# Patient Record
Sex: MALE | Race: OTHER
[De-identification: names, ages, dates, MRNs, and addresses within clinical notes are randomized per-mention and may not be internally consistent; named-entity substitution may affect disease eponyms.]

---

## 2021-02-18 ENCOUNTER — HOSPITAL ENCOUNTER (OUTPATIENT)
Dept: HOSPITAL 41 - JD.SDS | Age: 66
Discharge: HOME | End: 2021-02-18
Attending: OPHTHALMOLOGY
Payer: MEDICARE

## 2021-02-18 DIAGNOSIS — H25.813: Primary | ICD-10-CM

## 2021-02-18 DIAGNOSIS — Z88.8: ICD-10-CM

## 2021-02-18 DIAGNOSIS — Z87.891: ICD-10-CM

## 2021-02-18 DIAGNOSIS — Z79.899: ICD-10-CM

## 2021-02-18 DIAGNOSIS — Z86.73: ICD-10-CM

## 2021-02-18 DIAGNOSIS — H02.831: ICD-10-CM

## 2021-02-18 DIAGNOSIS — H40.053: ICD-10-CM

## 2021-02-18 DIAGNOSIS — H16.223: ICD-10-CM

## 2021-02-18 DIAGNOSIS — H02.834: ICD-10-CM

## 2021-02-18 DIAGNOSIS — H57.813: ICD-10-CM

## 2021-02-18 DIAGNOSIS — H35.373: ICD-10-CM

## 2021-02-18 DIAGNOSIS — Z88.5: ICD-10-CM

## 2021-02-18 DIAGNOSIS — H16.103: ICD-10-CM

## 2021-02-18 PROCEDURE — 66984 XCAPSL CTRC RMVL W/O ECP: CPT

## 2021-02-18 PROCEDURE — C1780 LENS, INTRAOCULAR (NEW TECH): HCPCS

## 2021-02-18 RX ADMIN — CEFUROXIME SCH MG: 750 INJECTION, POWDER, FOR SOLUTION INTRAMUSCULAR; INTRAVENOUS at 08:31

## 2021-02-18 RX ADMIN — PHENYLEPHRINE HYDROCHLORIDE SCH ML: 25 SOLUTION OPHTHALMIC at 09:20

## 2021-02-18 RX ADMIN — TETRACAINE HYDROCHLORIDE SCH DROP: 5 SOLUTION OPHTHALMIC at 09:17

## 2021-02-18 RX ADMIN — PHENYLEPHRINE HYDROCHLORIDE SCH DROP: 25 SOLUTION OPHTHALMIC at 08:53

## 2021-02-18 RX ADMIN — PHENYLEPHRINE HYDROCHLORIDE SCH DROP: 25 SOLUTION OPHTHALMIC at 08:43

## 2021-02-18 RX ADMIN — PILOCARPINE HYDROCHLORIDE SCH ML: 40 SOLUTION/ DROPS OPHTHALMIC at 08:31

## 2021-02-18 RX ADMIN — PHENYLEPHRINE HYDROCHLORIDE SCH DROP: 25 SOLUTION OPHTHALMIC at 08:33

## 2021-02-18 RX ADMIN — PHENYLEPHRINE HYDROCHLORIDE SCH DROP: 25 SOLUTION OPHTHALMIC at 09:12

## 2021-02-18 RX ADMIN — LIDOCAINE HYDROCHLORIDE SCH ML: 10 INJECTION, SOLUTION EPIDURAL; INFILTRATION; INTRACAUDAL; PERINEURAL at 08:31

## 2021-02-18 RX ADMIN — TETRACAINE HYDROCHLORIDE SCH ML: 5 SOLUTION OPHTHALMIC at 09:28

## 2021-02-18 RX ADMIN — PHENYLEPHRINE HYDROCHLORIDE SCH ML: 25 SOLUTION OPHTHALMIC at 08:30

## 2021-02-18 RX ADMIN — POLYMYXIN B SULFATE AND TRIMETHOPRIM SULFATE SCH ML: 10000; 1 SOLUTION/ DROPS OPHTHALMIC at 09:43

## 2021-02-18 RX ADMIN — PILOCARPINE HYDROCHLORIDE SCH ML: 40 SOLUTION/ DROPS OPHTHALMIC at 09:43

## 2021-02-18 RX ADMIN — POLYMYXIN B SULFATE AND TRIMETHOPRIM SULFATE SCH DROP: 10000; 1 SOLUTION/ DROPS OPHTHALMIC at 08:20

## 2021-02-18 RX ADMIN — POLYMYXIN B SULFATE AND TRIMETHOPRIM SULFATE SCH ML: 10000; 1 SOLUTION/ DROPS OPHTHALMIC at 08:32

## 2021-02-18 RX ADMIN — POLYMYXIN B SULFATE AND TRIMETHOPRIM SULFATE SCH DROP: 10000; 1 SOLUTION/ DROPS OPHTHALMIC at 09:03

## 2021-02-18 RX ADMIN — TETRACAINE HYDROCHLORIDE SCH ML: 5 SOLUTION OPHTHALMIC at 08:31

## 2021-02-18 RX ADMIN — LIDOCAINE HYDROCHLORIDE SCH ML: 10 INJECTION, SOLUTION EPIDURAL; INFILTRATION; INTRACAUDAL; PERINEURAL at 09:28

## 2021-02-18 RX ADMIN — CEFUROXIME SCH MG: 750 INJECTION, POWDER, FOR SOLUTION INTRAMUSCULAR; INTRAVENOUS at 09:43

## 2021-02-18 NOTE — PCM.PREANE
Preanesthetic Assessment





- Procedure


Proposed Procedure: 





Right Cataract Extraction with IOL.





- Anesthesia/Transfusion/Family Hx


Anesthesia History: Prior Anesthesia Without Reaction


Family History of Anesthesia Reaction: No


Transfusion History: No Prior Transfusion(s)


Intubation History: Unknown





- Review of Systems


General: No Symptoms


Pulmonary: No Symptoms (Quit smoking: more than 10 years ago.)


Cardiovascular: No Symptoms, Palpitations


Gastrointestinal: No Symptoms (GERD), Constipation, Diarrhea, Difficulty 

Swallowing


Neurological: No Symptoms (CVA: 2000)


Other: Reports: Easy Bleeding, Anxiety





- Physical Assessment


NPO Status Date: 02/17/21


NPO Status Time: 23:00


Vital Signs: 





HR:80


Sat:99%


Temp:97.9


Resp:16


B/P:124/88


Height: 1.63 m


Weight: 71.214 kg


ASA Class: 3


Mental Status: Alert & Oriented x3


Airway Class: Mallampati = 2


Dentition: Reports: Normal Dentition, Crown(s), Broken Tooth/Teeth, Missing 

Tooth/Teeth, Caries


Thyro-Mental Finger Breadths: 3


Mouth Opening Finger Breadths: 3


ROM/Head Extension: Full


Lungs: Clear to Auscultation, Normal Respiratory Effort


Cardiovascular: Regular Rate, Regular Rhythm, No Murmurs





- Allergies


Allergies/Adverse Reactions: 


                                    Allergies











Allergy/AdvReac Type Severity Reaction Status Date / Time


 


codeine Allergy  Rash Verified 02/17/21 12:29


 


morphine AdvReac  Delusions Verified 02/17/21 12:29














- Anesthesia Plan


Pre-Op Medication Ordered: None





- Acknowledgements


Anesthesia Type Planned: MAC


Pt an Appropriate Candidate for the Planned Anesthesia: Yes


Alternatives and Risks of Anesthesia Discussed w Pt/Guardian: Yes


Pt/Guardian Understands and Agrees with Anesthesia Plan: Yes





PreAnesthesia Questionnaire


HEENT History: Reports: Impaired Vision (disconjugate gaze)


Cardiovascular History: Reports: Bypass


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


Neurological History: Reports: CVA ( Hemorrhagic, 2 ruptured cerebral aneurysm,

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





- Infectious Disease History


Infectious Disease History: Reports: Meningitis (as an infant)





- Past Surgical History


Cardiovascular Surgical History: Reports: Other (See Below) (valve repair 

(unknown which) in [place], [place], 1967)





- HOME MEDS


Home Medications: 


                                    Home Meds





ALPRAZolam [Alprazolam Xr] 0.5 mg PO DAILY 10/23/18 [History]


Gabapentin [Neurontin] 300 mg PO BID 10/23/18 [History]











- CURRENT (IN HOUSE) MEDS


Current Meds: 





                               Current Medications





Brimonidine Tartrate (Alphagan 0.2% Ophth Soln)  0 ml EYERT ASDIRECTED KIMMY


   Stop: 02/18/21 23:00


Cefuroxime Sodium (Zinacef)  0 mg EYERT ASDIRECTED KIMMY


   Stop: 02/18/21 23:00


Lidocaine HCl (Xylocaine-Mpf 1%)  0 ml INJECT ASDIRECTED KIMMY


   Stop: 02/18/21 23:00


Phenylephrine HCl (Betito-Synephrine 2.5% Ophth Soln)  0 ml EYERT ASDIRECTED KIMMY


   Stop: 02/18/21 23:00


Pilocarpine HCl (Pilocar 4% Ophth Soln)  0 ml EYERT ASDIRECTED KIMMY


   Stop: 02/18/21 23:00


Polymyxin/Trimethoprim Sulfate (Polytrim Ophth Soln)  0 ml EYERT ASDIRECTED KIMMY


   Stop: 02/18/21 23:00


Tetracaine HCl (Tetracaine 0.5% Steri-Unit Sol)  0 ml EYEBOTH ASDIRECTED KIMMY


   Stop: 02/18/21 23:00


Tropicamide (Mydriacyl 1% Ophth Soln)  0 ml EYERT ASDIRECTED KIMMY


   Stop: 02/18/21 23:00

## 2021-03-18 ENCOUNTER — HOSPITAL ENCOUNTER (OUTPATIENT)
Dept: HOSPITAL 41 - JD.SDS | Age: 66
Discharge: HOME | End: 2021-03-18
Attending: OPHTHALMOLOGY
Payer: MEDICARE

## 2021-03-18 DIAGNOSIS — Z96.1: ICD-10-CM

## 2021-03-18 DIAGNOSIS — Z88.8: ICD-10-CM

## 2021-03-18 DIAGNOSIS — H35.373: ICD-10-CM

## 2021-03-18 DIAGNOSIS — H25.812: Primary | ICD-10-CM

## 2021-03-18 DIAGNOSIS — H02.831: ICD-10-CM

## 2021-03-18 DIAGNOSIS — H40.053: ICD-10-CM

## 2021-03-18 DIAGNOSIS — H57.813: ICD-10-CM

## 2021-03-18 DIAGNOSIS — H02.834: ICD-10-CM

## 2021-03-18 DIAGNOSIS — Z88.6: ICD-10-CM

## 2021-03-18 DIAGNOSIS — Z86.73: ICD-10-CM

## 2021-03-18 DIAGNOSIS — Z98.890: ICD-10-CM

## 2021-03-18 DIAGNOSIS — Z87.891: ICD-10-CM

## 2021-03-18 DIAGNOSIS — Z79.899: ICD-10-CM

## 2021-03-18 PROCEDURE — C1780 LENS, INTRAOCULAR (NEW TECH): HCPCS

## 2021-03-18 PROCEDURE — 66984 XCAPSL CTRC RMVL W/O ECP: CPT

## 2021-03-18 RX ADMIN — POLYMYXIN B SULFATE AND TRIMETHOPRIM SULFATE SCH DROP: 10000; 1 SOLUTION/ DROPS OPHTHALMIC at 09:57

## 2021-03-18 RX ADMIN — PILOCARPINE HYDROCHLORIDE SCH ML: 40 SOLUTION/ DROPS OPHTHALMIC at 10:37

## 2021-03-18 RX ADMIN — LIDOCAINE HYDROCHLORIDE SCH ML: 10 INJECTION, SOLUTION EPIDURAL; INFILTRATION; INTRACAUDAL; PERINEURAL at 11:09

## 2021-03-18 RX ADMIN — POLYMYXIN B SULFATE AND TRIMETHOPRIM SULFATE SCH ML: 10000; 1 SOLUTION/ DROPS OPHTHALMIC at 10:37

## 2021-03-18 RX ADMIN — TETRACAINE HYDROCHLORIDE SCH ML: 5 SOLUTION OPHTHALMIC at 11:08

## 2021-03-18 RX ADMIN — CEFUROXIME SCH MG: 750 INJECTION, POWDER, FOR SOLUTION INTRAMUSCULAR; INTRAVENOUS at 10:36

## 2021-03-18 RX ADMIN — TETRACAINE HYDROCHLORIDE SCH DROP: 5 SOLUTION OPHTHALMIC at 10:08

## 2021-03-18 RX ADMIN — LIDOCAINE HYDROCHLORIDE SCH ML: 10 INJECTION, SOLUTION EPIDURAL; INFILTRATION; INTRACAUDAL; PERINEURAL at 10:27

## 2021-03-18 RX ADMIN — PILOCARPINE HYDROCHLORIDE SCH ML: 40 SOLUTION/ DROPS OPHTHALMIC at 11:09

## 2021-03-18 RX ADMIN — TETRACAINE HYDROCHLORIDE SCH DROP: 5 SOLUTION OPHTHALMIC at 10:15

## 2021-03-18 RX ADMIN — POLYMYXIN B SULFATE AND TRIMETHOPRIM SULFATE SCH ML: 10000; 1 SOLUTION/ DROPS OPHTHALMIC at 11:10

## 2021-03-18 RX ADMIN — TETRACAINE HYDROCHLORIDE SCH DROP: 5 SOLUTION OPHTHALMIC at 10:13

## 2021-03-18 RX ADMIN — CEFUROXIME SCH MG: 750 INJECTION, POWDER, FOR SOLUTION INTRAMUSCULAR; INTRAVENOUS at 11:09

## 2021-03-18 RX ADMIN — POLYMYXIN B SULFATE AND TRIMETHOPRIM SULFATE SCH DROP: 10000; 1 SOLUTION/ DROPS OPHTHALMIC at 09:17

## 2021-03-18 RX ADMIN — TETRACAINE HYDROCHLORIDE SCH ML: 5 SOLUTION OPHTHALMIC at 10:26

## 2021-03-18 NOTE — PCM.PREANE
Preanesthetic Assessment





- Procedure


Proposed Procedure: 





left eye cataract extraction with IOL








- Anesthesia/Transfusion/Family Hx


Anesthesia History: Prior Anesthesia Without Reaction


Family History of Anesthesia Reaction: No


Transfusion History: No Prior Transfusion(s)


Intubation History: Unknown





- Review of Systems


General: No Symptoms


Pulmonary: No Symptoms


Cardiovascular: No Symptoms


Gastrointestinal: No Symptoms


Neurological: Weakness, Gait Disturbance (stroke)


Other: Reports: Anxiety





- Physical Assessment


NPO Status Date: 03/18/21


NPO Status Time: 09:00


Height: 1.75 m


Weight: 74.843 kg


ASA Class: 3


Mental Status: Alert & Oriented x3


Airway Class: Mallampati = 2


Dentition: Reports: Missing Tooth/Teeth, Caries


Thyro-Mental Finger Breadths: 3


Mouth Opening Finger Breadths: 2


ROM/Head Extension: Limited/Partial


Lungs: Clear to Auscultation, Normal Respiratory Effort


Cardiovascular: Regular Rate, Regular Rhythm





- Allergies


Allergies/Adverse Reactions: 


                                    Allergies











Allergy/AdvReac Type Severity Reaction Status Date / Time


 


codeine Allergy  Rash Verified 03/17/21 12:59


 


warfarin Allergy  Cannot Verified 03/17/21 12:59





   Remember  














- Anesthesia Plan


Pre-Op Medication Ordered: None





- Acknowledgements


Anesthesia Type Planned: MAC


Pt an Appropriate Candidate for the Planned Anesthesia: Yes


Alternatives and Risks of Anesthesia Discussed w Pt/Guardian: Yes


Pt/Guardian Understands and Agrees with Anesthesia Plan: Yes





PreAnesthesia Questionnaire


HEENT History: Reports: Impaired Vision (disconjugate gaze)


Cardiovascular History: Reports: Bypass


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


Neurological History: Reports: CVA ( Hemorrhagic, 2 ruptured cerebral aneurysm,

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





- Infectious Disease History


Infectious Disease History: Reports: Meningitis (as an infant)





- Past Surgical History


Cardiovascular Surgical History: Reports: Other (See Below) (valve repair 

(unknown which) in [place], [place], 1967)





- HOME MEDS


Home Medications: 


                                    Home Meds





ALPRAZolam [Alprazolam Xr] 0.5 mg PO DAILY 10/23/18 [History]


Gabapentin [Neurontin] 300 mg PO BID 10/23/18 [History]











- CURRENT (IN HOUSE) MEDS


Current Meds: 





                               Current Medications





Brimonidine Tartrate (Brimonidine 0.2% Ophth Soln 5 Ml Bottle)  0 ml EYELF 

ASDIRECTED KIMMY


   Stop: 03/18/21 23:00


Cefuroxime Sodium (Cefuroxime 10 Mg/Ml Syringe)  0 mg EYELF ASDIRECTED KIMMY


   Stop: 03/18/21 23:00


Lidocaine HCl (Lidocaine 1% Pf 2 Ml Sdv)  0 ml INJECT ASDIRECTED KIMMY


   Stop: 03/18/21 23:00


Phenylephrine HCl (Phenylephrine 2.5% Ophth Soln 2 Ml Bot)  0 ml EYELF 

ASDIRECTED KIMMY


   Stop: 03/18/21 23:00


Pilocarpine HCl (Pilocarpine 4% Ophth Soln 15 Ml Bot)  0 ml EYELF ASDIRECTED KIMMY


   Stop: 03/18/21 23:00


Polymyxin/Trimethoprim Sulfate (Polymyxin B/Trimethoprim 10 Ml Bottle)  0 ml 

EYELF ASDIRECTED KIMMY


   Stop: 03/18/21 23:00


   Last Admin: 03/18/21 09:17 Dose:  1 drop


   Documented by: 


Tetracaine HCl (Tetracaine Hcl/Pf 0.5% 4 Ml Bottle)  0 ml EYEBOTH ASDIRECTED KIMMY


   Stop: 03/18/21 23:00


Tropicamide (Tropicamide 1% Ophth Soln 15 Ml Bottle)  0 ml EYELF ASDIRECTED KIMMY


   Stop: 03/18/21 23:00